# Patient Record
Sex: FEMALE | Race: WHITE | Employment: PART TIME | URBAN - METROPOLITAN AREA
[De-identification: names, ages, dates, MRNs, and addresses within clinical notes are randomized per-mention and may not be internally consistent; named-entity substitution may affect disease eponyms.]

---

## 2019-05-20 DIAGNOSIS — I10 HYPERTENSION, UNSPECIFIED TYPE: ICD-10-CM

## 2019-05-20 DIAGNOSIS — Q25.1 COARCTATION OF AORTA: Primary | ICD-10-CM

## 2019-05-20 DIAGNOSIS — Q23.0 CONGENITAL AORTIC STENOSIS: ICD-10-CM

## 2019-05-20 DIAGNOSIS — I37.0 NONRHEUMATIC PULMONARY VALVE STENOSIS: ICD-10-CM

## 2019-05-31 ENCOUNTER — OFFICE VISIT (OUTPATIENT)
Dept: CARDIOLOGY CLINIC | Age: 34
End: 2019-05-31

## 2019-05-31 ENCOUNTER — HOSPITAL ENCOUNTER (OUTPATIENT)
Dept: NON INVASIVE DIAGNOSTICS | Age: 34
Discharge: HOME | End: 2019-05-31
Attending: NURSE PRACTITIONER
Payer: COMMERCIAL

## 2019-05-31 VITALS
WEIGHT: 149 LBS | TEMPERATURE: 98 F | SYSTOLIC BLOOD PRESSURE: 118 MMHG | HEIGHT: 64 IN | BODY MASS INDEX: 25.44 KG/M2 | OXYGEN SATURATION: 99 % | RESPIRATION RATE: 16 BRPM | DIASTOLIC BLOOD PRESSURE: 72 MMHG | HEART RATE: 68 BPM

## 2019-05-31 DIAGNOSIS — Z95.2 PULMONARY VALVE REPLACED: ICD-10-CM

## 2019-05-31 DIAGNOSIS — I10 HYPERTENSION, UNSPECIFIED TYPE: ICD-10-CM

## 2019-05-31 DIAGNOSIS — I37.0 NONRHEUMATIC PULMONARY VALVE STENOSIS: ICD-10-CM

## 2019-05-31 DIAGNOSIS — Q23.0 CONGENITAL AORTIC STENOSIS: ICD-10-CM

## 2019-05-31 DIAGNOSIS — J45.909 UNCOMPLICATED ASTHMA, UNSPECIFIED ASTHMA SEVERITY, UNSPECIFIED WHETHER PERSISTENT: ICD-10-CM

## 2019-05-31 DIAGNOSIS — D64.9 ANEMIA, UNSPECIFIED TYPE: ICD-10-CM

## 2019-05-31 DIAGNOSIS — Z95.4 S/P ROSS PROCEDURE: ICD-10-CM

## 2019-05-31 DIAGNOSIS — Q25.1 COARCTATION OF AORTA: ICD-10-CM

## 2019-05-31 DIAGNOSIS — Q25.1 COARCTATION OF AORTA: Primary | ICD-10-CM

## 2019-05-31 LAB
ECHO AV AREA PEAK VELOCITY: 2.4 CM2
ECHO AV AREA VTI: 2.8 CM2
ECHO AV AREA/BSA PEAK VELOCITY: 1.4 CM2/M2
ECHO AV AREA/BSA VTI: 1.6 CM2/M2
ECHO AV MEAN GRADIENT: 4.6 MMHG
ECHO AV PEAK GRADIENT: 11.3 MMHG
ECHO AV PEAK VELOCITY: 168.08 CM/S
ECHO AV REGURGITANT PHT: 558.7 CM
ECHO AV VTI: 32.82 CM
ECHO LV EDV A2C: 48.2 ML
ECHO LV EDV A4C: 41.1 ML
ECHO LV EDV BP: 44.3 ML (ref 56–104)
ECHO LV EDV INDEX A4C: 23.8 ML/M2
ECHO LV EDV INDEX BP: 25.7 ML/M2
ECHO LV EDV NDEX A2C: 27.9 ML/M2
ECHO LV EJECTION FRACTION A2C: -177 %
ECHO LV EJECTION FRACTION A4C: -122 %
ECHO LV EJECTION FRACTION BIPLANE: -160 % (ref 55–100)
ECHO LV ESV A2C: 133.3 ML
ECHO LV ESV A4C: 90.9 ML
ECHO LV ESV BP: 115.2 ML (ref 19–49)
ECHO LV ESV INDEX A2C: 77.2 ML/M2
ECHO LV ESV INDEX A4C: 52.7 ML/M2
ECHO LV ESV INDEX BP: 66.7 ML/M2
ECHO LV INTERNAL DIMENSION DIASTOLIC: 5 CM (ref 3.9–5.3)
ECHO LV INTERNAL DIMENSION SYSTOLIC: 2.92 CM
ECHO LV IVSD: 0.57 CM (ref 0.6–0.9)
ECHO LV MASS 2D: 136.3 G (ref 67–162)
ECHO LV MASS INDEX 2D: 79 G/M2 (ref 43–95)
ECHO LV POSTERIOR WALL DIASTOLIC: 0.88 CM (ref 0.6–0.9)
ECHO LVOT DIAM: 1.99 CM
ECHO LVOT PEAK GRADIENT: 6.8 MMHG
ECHO LVOT PEAK VELOCITY: 130.28 CM/S
ECHO LVOT SV: 92.2 ML
ECHO LVOT VTI: 29.5 CM
ECHO MV A VELOCITY: 44.58 CM/S
ECHO MV AREA PHT: 3.1 CM2
ECHO MV AREA VTI: 2 CM2
ECHO MV E VELOCITY: 150.02 CM/S
ECHO MV E/A RATIO: 3.37
ECHO MV MAX VELOCITY: 190.99 CM/S
ECHO MV MEAN GRADIENT: 3.5 MMHG
ECHO MV PEAK GRADIENT: 14.6 MMHG
ECHO MV PRESSURE HALF TIME (PHT): 72 MS
ECHO MV VTI: 45.19 CM
ECHO PV MAX VELOCITY: 3.89 CM/S
ECHO PV PEAK GRADIENT: 0 MMHG
ECHO RV INTERNAL DIMENSION: 3.51 CM
ECHO RV TAPSE: 2.34 CM (ref 1.5–2)
ECHO TRICUSPID ANNULAR PEAK SYSTOLIC VELOCITY: 2.3 CM/S
PISA AR MAX VEL: 460.14 CM/S

## 2019-05-31 PROCEDURE — 93306 TTE W/DOPPLER COMPLETE: CPT

## 2019-05-31 RX ORDER — GUAIFENESIN 100 MG/5ML
81 LIQUID (ML) ORAL DAILY
COMMUNITY

## 2019-05-31 RX ORDER — PROMETHAZINE HYDROCHLORIDE 25 MG/1
25 TABLET ORAL
COMMUNITY

## 2019-05-31 RX ORDER — SUMATRIPTAN 50 MG/1
50 TABLET, FILM COATED ORAL
COMMUNITY

## 2019-05-31 NOTE — PROGRESS NOTES
Patient: Cassy Painter   Age: 28 y.o. Patient Care Team:  Edward Toney MD as PCP - General (Family Practice)  Janora Cogan, MD as Surgeon (Cardiology)    PCP: Edward Toney MD    Cardiologist: Alonso Mcnair    Diagnosis/Reason for Consultation: The primary encounter diagnosis was Coarctation of aorta. Diagnoses of Congenital aortic stenosis, S/P Ross procedure, Nonrheumatic pulmonary valve stenosis, Pulmonary valve replaced, Hypertension, unspecified type, Uncomplicated asthma, unspecified asthma severity, unspecified whether persistent, and Anemia, unspecified type were also pertinent to this visit. Problem List:   Patient Active Problem List   Diagnosis Code    HTN (hypertension) I10    Congenital aortic stenosis Q23.0    Coarctation of aorta Q25.1    Pulmonic valve stenosis I37.0      HPI: 28 y.o.  female with PMHx of Coarctation s/p repair (age 1), congential AS s/p BAV (1990) and ultimate Ross procedure (21mm RV-PA homograft in 1993) and pulmonary valve stenosis s/p percutaneous PVR (23mm [place] XT 1/2016),HTN, Anemia, Migraines & Asthma that is here today for routine f/u. She last saw Dr. Alonso Mcnair at [facility] in January 2017. Ms. Joesph Perea was doing well up until last summer when she presented to her PCP w/ worsening fatigue, intermittent LE edema with rash and pain. She was started on thyroid medicines for 'barely abnormal labs', gabapentin and furosemide. She was told she had a  'cardiac neuropathic rash.'  She felt 'spacey' and 'out of her body' and therefore stopped taking those meds after about a month. She did feel like the furosemide helped her LE swelling some but nothing resolved her fatigue or rash. 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 She is  to a  and has 7 children - 2 biologic and then rest adopted and two have special needs. There are 2 5 yr olds, 3 10 yr olds and a 5 and 6 yr old.   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 Today, Ms. Karissa Cloud does not have any rash or LE edema. She states this occurs if she stands for more than 4 hrs and brought photos of her R leg she has taken when it was at its worse. The LE edema is also often accompanied by a 'jelly' feeling and she recently fell b/c her legs 'gave out.'  She also states he has SOB/PARISI when carrying laundry upstairs, 4-5 pillow orthopnea and occasional CP that she considered seeking emergency care for had she had someone to be with the children while she went to the ED but since she didn't, she waited out the pain which eventually subsided. She has tried some homeopathic regimens to include essential oils w/o any improvement. In addition she is also having a resumption of her migraine HAs twice monthly but they are now different in their presentation than they have been historically and during which she gets intense nausea. She has an upcoming neurology appt to address this. She denies any palpitations. R arm BP: 144/80  L arm BP: 118/72  R leg SBP: 160    Past Medical History:   Diagnosis Date    Anemia     Asthma     Coarctation of aorta 4/9/2011    Congenital aortic stenosis 4/9/2011    HTN (hypertension) 4/9/2011    Migraine     Pulmonic valve stenosis 4/9/2011       Past Surgical History:   Procedure Laterality Date    CARDIAC CATHETERIZATION  7/2011    mild pulmonary homograft stenosis, moderate PI, no coarctation    ECHO 2D ADULT  4/2011     EF 60%, normal AVR function, trivial AI, PV - peak velocity 2.3 m/sec, moderate PI, RVSP 54 mmHg      Social History     Tobacco Use    Smoking status: Never Smoker   Substance Use Topics    Alcohol use: Not on file      No family history on file. Prior to Admission medications    Medication Sig Start Date End Date Taking?  Authorizing Provider   aspirin 81 mg chewable tablet Take 81 mg by mouth daily. Yes Provider, Historical   SUMAtriptan (IMITREX) 50 mg tablet Take 50 mg by mouth once as needed for Migraine. Yes Provider, Historical   promethazine (PHENERGAN) 25 mg tablet Take 25 mg by mouth every six (6) hours as needed for Nausea. Yes Provider, Historical   ONDANSETRON HCL (ZOFRAN PO) Take  by mouth as needed. Yes Provider, Historical       Allergies   Allergen Reactions    Erythromycin Nausea and Vomiting    Compazine [Prochlorperazine Edisylate] Other (comments)     Stroke-like sx.  Hydromorphone Hives    Zithromax [Azithromycin] Nausea and Vomiting       Current Medications:   Current Outpatient Medications   Medication Sig Dispense Refill    aspirin 81 mg chewable tablet Take 81 mg by mouth daily.  SUMAtriptan (IMITREX) 50 mg tablet Take 50 mg by mouth once as needed for Migraine.  promethazine (PHENERGAN) 25 mg tablet Take 25 mg by mouth every six (6) hours as needed for Nausea.  ONDANSETRON HCL (ZOFRAN PO) Take  by mouth as needed. Vitals: Blood pressure 118/72, pulse 68, temperature 98 °F (36.7 °C), temperature source Oral, resp. rate 16, height 5' 4\" (1.626 m), weight 149 lb (67.6 kg), SpO2 99 %. Allergies: is allergic to erythromycin; compazine [prochlorperazine edisylate]; hydromorphone; and zithromax [azithromycin].     Review of Systems: Pertinent Positives per HPI   [x]Total of 13 systems reviewed as follows:  Constitutional: Negative fever, negative chills  Eyes:   Negative for amauroses fugax  ENT:   Negative sore throat,oral absecess  Endocrine Negative for thyroid replacement Rx; goiter  Respiratory:  Negative chronic cough,sputum production  Cards:   Negative for palpitations, lower extremity edema, varicosities, claudication  GI:   Negative for dysphagia, bleeding, nausea, vomiting, diarrhea, and abdominal pain  Genitourinary: Negative for frequency, dysuria  Integument:  Negative for rash and pruritus  Hematologic:  Negative for easy bruising; bleeding dyscarsia  Musculoskel: Negative for muscle weakness inhibiting ambulation  Neurological:  Negative for stroke, TIA, syncope, dizziness  Behavl/Psych: Negative for feelings of anxiety, depression     Cardiovascular Testing:   TTE today:  Images reviewed by Dr. Eleanor Cunha. Read not available at time of note    Physical Exam:  General: Well nourished well groomed woman appearing stated age   Neuro: A&OX3. NUNEZ. PERRL. Steady un-ssisted gait  Head:Normocephalic. Atraumatic. Symmetrical  Neck: Trachea Midline  Resp: CTA B. No Adv BS/cough/sputum/tachypnea with seated conversation  CV: S1S2 RRR. LIOR III/VI. No JVD/carotid bruits. Pink/warm/dry extremities. No LE peripheral edema  GI:Benign ab. Soft. NT/ND. Active BS  : Voids  Integ: No obvious s/s of infection or breakdown  Musculo/Skeletal: FROM in all major joints. Good muscle tone    Assessment/Plan:   1. Coarctation s/p repair (age 1) - slight concern on a particular echo image today for resumption today but BP in leg appropriate and therefore not a current issue  2. Congential AS s/p BAV (1990) and ultimate Ross procedure (21mm RV-PA homograft in 1993) -  Good valve   3. Pulmonary valve stenosis s/p percutaneous PVR (23mm [place] XT 1/2016) - good function and flow. F/u in 2-3 yrs w/ TTE. ASA 81mg daily for valve patency for life. SBE prophylax for life reviewed. 4. HTN - slightly elevated today. Pt to monitor for 2 weeks and call. Taken on BP meds Jan 2017 but may benefit from resumption if unable to control with diet/exericse and stress reduction.   5. Further plan/care by Dr. Eleanor Cunha

## 2019-05-31 NOTE — PROGRESS NOTES
I had the pleasure of seeing Ms. Ciera Rahman in the valve clinic earlier today with Ms. Luna Goodell, NP - please see her note for details. She has underwent pulmonary valve replacement 2 years ago, and a remote Ross procedure. Echo looks good and her symptoms are not related to her heart. She does have hypertension here, although no limb bp gradient. Will get bp measurements at home, and then consider treatment options.